# Patient Record
Sex: MALE | Race: WHITE | NOT HISPANIC OR LATINO | URBAN - METROPOLITAN AREA
[De-identification: names, ages, dates, MRNs, and addresses within clinical notes are randomized per-mention and may not be internally consistent; named-entity substitution may affect disease eponyms.]

---

## 2019-11-08 PROBLEM — 428283002 HISTORY OF POLYP OF COLON: Status: ACTIVE | Noted: 2019-11-08

## 2020-07-01 ENCOUNTER — OFFICE VISIT (OUTPATIENT)
Dept: URBAN - METROPOLITAN AREA CLINIC 46 | Facility: CLINIC | Age: 65
End: 2020-07-01

## 2021-08-10 ENCOUNTER — OFFICE VISIT (OUTPATIENT)
Dept: URBAN - METROPOLITAN AREA CLINIC 44 | Facility: CLINIC | Age: 66
End: 2021-08-10

## 2021-08-28 ENCOUNTER — TELEPHONE ENCOUNTER (OUTPATIENT)
Dept: URBAN - METROPOLITAN AREA CLINIC 13 | Facility: CLINIC | Age: 66
End: 2021-08-28

## 2021-08-29 ENCOUNTER — TELEPHONE ENCOUNTER (OUTPATIENT)
Dept: URBAN - METROPOLITAN AREA CLINIC 13 | Facility: CLINIC | Age: 66
End: 2021-08-29

## 2021-08-29 RX ORDER — PANTOPRAZOLE 20 MG/1
TABLET, DELAYED RELEASE ORAL
Status: ACTIVE | COMMUNITY
Start: 2019-12-31

## 2024-09-10 ENCOUNTER — OFFICE VISIT (OUTPATIENT)
Dept: URBAN - METROPOLITAN AREA CLINIC 48 | Facility: CLINIC | Age: 69
End: 2024-09-10
Payer: MEDICARE

## 2024-09-10 ENCOUNTER — LAB OUTSIDE AN ENCOUNTER (OUTPATIENT)
Dept: URBAN - METROPOLITAN AREA CLINIC 48 | Facility: CLINIC | Age: 69
End: 2024-09-10

## 2024-09-10 ENCOUNTER — DASHBOARD ENCOUNTERS (OUTPATIENT)
Age: 69
End: 2024-09-10

## 2024-09-10 VITALS
HEART RATE: 70 BPM | WEIGHT: 199 LBS | SYSTOLIC BLOOD PRESSURE: 147 MMHG | BODY MASS INDEX: 29.47 KG/M2 | HEIGHT: 69 IN | TEMPERATURE: 97.7 F | DIASTOLIC BLOOD PRESSURE: 78 MMHG

## 2024-09-10 DIAGNOSIS — R13.19 CERVICAL DYSPHAGIA: ICD-10-CM

## 2024-09-10 DIAGNOSIS — Z86.010 PERSONAL HISTORY OF COLONIC POLYPS: ICD-10-CM

## 2024-09-10 DIAGNOSIS — R10.9 RIGHT SIDED ABDOMINAL PAIN: ICD-10-CM

## 2024-09-10 PROBLEM — 285388000: Status: ACTIVE | Noted: 2024-09-10

## 2024-09-10 PROBLEM — 428283002: Status: ACTIVE | Noted: 2024-09-10

## 2024-09-10 PROCEDURE — 99214 OFFICE O/P EST MOD 30 MIN: CPT | Performed by: NURSE PRACTITIONER

## 2024-09-10 RX ORDER — HYOSCYAMINE SULFATE 0.125 MG
1 TABLET ON THE TONGUE AND ALLOW TO DISSOLVE AS NEEDED FOR ABDOMINAL PAIN TABLET,DISINTEGRATING ORAL THREE TIMES A DAY
Qty: 90 | Refills: 1 | OUTPATIENT
Start: 2024-09-10 | End: 2024-11-08

## 2024-09-10 NOTE — HPI-TODAY'S VISIT:
69 year old male with a history of dysphagia, presents for evaluation of abdominal pain. Pain is in the right umbilus to mid abdomen area. Pain started about 2 months ago and right after it that, he was diagnosed with shingles. Pain occurs in the morning and worsens through the day. He notes an edematous area in the same area. Rash is right abdomen to back.  He had a CT with oral and IV contrast in [place] that showed issues with his back (we do not have report). The patient was on Gabapentin and Acyclovir. He continues Gabapentin at night . Admits to GERD for which he does not take medication.   Dysphagia is starting to worsen. 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   Bowel movements are daily of formed stool. Last colonoscopy was in 12/2019 and showed an adenomatous polyp and grade 2 internal hemorrhoids.